# Patient Record
Sex: MALE | Race: WHITE | URBAN - NONMETROPOLITAN AREA
[De-identification: names, ages, dates, MRNs, and addresses within clinical notes are randomized per-mention and may not be internally consistent; named-entity substitution may affect disease eponyms.]

---

## 2020-09-17 ENCOUNTER — IMPORTED ENCOUNTER (OUTPATIENT)
Dept: URBAN - NONMETROPOLITAN AREA CLINIC 1 | Facility: CLINIC | Age: 50
End: 2020-09-17

## 2020-09-17 PROBLEM — H52.221: Noted: 2020-09-17

## 2020-09-17 PROBLEM — H52.12: Noted: 2020-09-17

## 2020-09-17 PROBLEM — H52.4: Noted: 2020-09-17

## 2020-09-17 PROCEDURE — 92015 DETERMINE REFRACTIVE STATE: CPT

## 2020-09-17 PROCEDURE — 92004 COMPRE OPH EXAM NEW PT 1/>: CPT

## 2020-09-17 NOTE — PATIENT DISCUSSION
Simple Astigmatism OD/Simple Myopia OS w/Presbyopia-  discussed findings w/patient-  new spectacle Rx issued-  continue to monitor yearly or prn; 's Notes: MR 9/17/2020DFE deferred

## 2021-09-20 ENCOUNTER — IMPORTED ENCOUNTER (OUTPATIENT)
Dept: URBAN - NONMETROPOLITAN AREA CLINIC 1 | Facility: CLINIC | Age: 51
End: 2021-09-20

## 2021-09-20 PROBLEM — H52.4: Noted: 2020-09-17

## 2021-09-20 PROBLEM — H52.222: Noted: 2021-09-20

## 2021-09-20 PROBLEM — H52.12: Noted: 2021-09-20

## 2021-09-20 PROCEDURE — 92014 COMPRE OPH EXAM EST PT 1/>: CPT

## 2021-09-20 PROCEDURE — 92015 DETERMINE REFRACTIVE STATE: CPT

## 2021-09-20 NOTE — PATIENT DISCUSSION
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

## 2022-04-09 ASSESSMENT — VISUAL ACUITY
OD_CC: 20/20
OU_CC: 20/15
OS_CC: 20/30+2
OD_CC: 20/20
OS_CC: 20/25
OU_SC: 20/20

## 2022-04-09 ASSESSMENT — TONOMETRY
OD_IOP_MMHG: 16
OD_IOP_MMHG: 14
OS_IOP_MMHG: 16
OS_IOP_MMHG: 15